# Patient Record
Sex: FEMALE | Race: WHITE | Employment: FULL TIME | URBAN - METROPOLITAN AREA
[De-identification: names, ages, dates, MRNs, and addresses within clinical notes are randomized per-mention and may not be internally consistent; named-entity substitution may affect disease eponyms.]

---

## 2017-05-03 PROBLEM — M25.50 ARTHRALGIA: Status: ACTIVE | Noted: 2017-05-03

## 2017-07-05 ENCOUNTER — HOSPITAL ENCOUNTER (OUTPATIENT)
Dept: MAMMOGRAPHY | Age: 47
Discharge: HOME | End: 2017-07-05
Attending: NURSE PRACTITIONER
Payer: COMMERCIAL

## 2017-07-05 DIAGNOSIS — Z12.39 SCREENING FOR MALIGNANT NEOPLASM OF BREAST: ICD-10-CM

## 2017-07-05 PROCEDURE — 77067 SCR MAMMO BI INCL CAD: CPT

## 2017-07-06 NOTE — PROGRESS NOTES
NO SPECIFIC MAMMOGRAPHIC EVIDENCE FOR MALIGNANCY. FOLLOW UP BILATERAL SCREENING MAMMOGRAPHY IS RECOMMENDED IN ONE YEAR.

## 2018-05-09 PROBLEM — Z99.89 OSA ON CPAP: Status: ACTIVE | Noted: 2018-05-09

## 2018-05-09 PROBLEM — G47.33 OSA ON CPAP: Status: ACTIVE | Noted: 2018-05-09

## 2018-06-11 ENCOUNTER — HOSPITAL ENCOUNTER (OUTPATIENT)
Dept: SURGERY | Age: 48
Discharge: HOME | End: 2018-06-11

## 2018-06-13 VITALS — BODY MASS INDEX: 26.66 KG/M2 | HEIGHT: 65 IN | WEIGHT: 160 LBS

## 2018-06-17 ENCOUNTER — ANESTHESIA EVENT (OUTPATIENT)
Dept: ENDOSCOPY | Age: 48
End: 2018-06-17
Payer: COMMERCIAL

## 2018-06-17 RX ORDER — FENTANYL CITRATE 50 UG/ML
100 INJECTION, SOLUTION INTRAMUSCULAR; INTRAVENOUS AS NEEDED
Status: CANCELLED | OUTPATIENT
Start: 2018-06-17

## 2018-06-17 RX ORDER — OXYCODONE HYDROCHLORIDE 5 MG/1
10 TABLET ORAL
Status: CANCELLED | OUTPATIENT
Start: 2018-06-17 | End: 2018-06-18

## 2018-06-17 RX ORDER — DIPHENHYDRAMINE HYDROCHLORIDE 50 MG/ML
12.5 INJECTION, SOLUTION INTRAMUSCULAR; INTRAVENOUS ONCE
Status: CANCELLED | OUTPATIENT
Start: 2018-06-17 | End: 2018-06-17

## 2018-06-17 RX ORDER — SODIUM CHLORIDE 0.9 % (FLUSH) 0.9 %
5-10 SYRINGE (ML) INJECTION EVERY 8 HOURS
Status: CANCELLED | OUTPATIENT
Start: 2018-06-17

## 2018-06-17 RX ORDER — HYDROMORPHONE HYDROCHLORIDE 2 MG/ML
0.5 INJECTION, SOLUTION INTRAMUSCULAR; INTRAVENOUS; SUBCUTANEOUS
Status: CANCELLED | OUTPATIENT
Start: 2018-06-17

## 2018-06-17 RX ORDER — NALOXONE HYDROCHLORIDE 0.4 MG/ML
0.1 INJECTION, SOLUTION INTRAMUSCULAR; INTRAVENOUS; SUBCUTANEOUS AS NEEDED
Status: CANCELLED | OUTPATIENT
Start: 2018-06-17 | End: 2018-06-17

## 2018-06-17 RX ORDER — OXYCODONE HYDROCHLORIDE 5 MG/1
5 TABLET ORAL
Status: CANCELLED | OUTPATIENT
Start: 2018-06-17 | End: 2018-06-18

## 2018-06-17 RX ORDER — SODIUM CHLORIDE, SODIUM LACTATE, POTASSIUM CHLORIDE, CALCIUM CHLORIDE 600; 310; 30; 20 MG/100ML; MG/100ML; MG/100ML; MG/100ML
75 INJECTION, SOLUTION INTRAVENOUS CONTINUOUS
Status: CANCELLED | OUTPATIENT
Start: 2018-06-17

## 2018-06-17 RX ORDER — SODIUM CHLORIDE 0.9 % (FLUSH) 0.9 %
5-10 SYRINGE (ML) INJECTION AS NEEDED
Status: CANCELLED | OUTPATIENT
Start: 2018-06-17

## 2018-06-17 RX ORDER — MIDAZOLAM HYDROCHLORIDE 1 MG/ML
2 INJECTION, SOLUTION INTRAMUSCULAR; INTRAVENOUS
Status: CANCELLED | OUTPATIENT
Start: 2018-06-17 | End: 2018-06-18

## 2018-06-17 RX ORDER — ACETAMINOPHEN 500 MG
500 TABLET ORAL ONCE
Status: CANCELLED | OUTPATIENT
Start: 2018-06-17 | End: 2018-06-17

## 2018-06-17 RX ORDER — ONDANSETRON 2 MG/ML
4 INJECTION INTRAMUSCULAR; INTRAVENOUS ONCE
Status: CANCELLED | OUTPATIENT
Start: 2018-06-17 | End: 2018-06-17

## 2018-06-18 ENCOUNTER — ANESTHESIA (OUTPATIENT)
Dept: ENDOSCOPY | Age: 48
End: 2018-06-18
Payer: COMMERCIAL

## 2018-06-18 ENCOUNTER — HOSPITAL ENCOUNTER (OUTPATIENT)
Age: 48
Setting detail: OUTPATIENT SURGERY
Discharge: HOME | End: 2018-06-18
Attending: SURGERY | Admitting: SURGERY
Payer: COMMERCIAL

## 2018-06-18 VITALS
HEIGHT: 65 IN | DIASTOLIC BLOOD PRESSURE: 64 MMHG | OXYGEN SATURATION: 93 % | HEART RATE: 67 BPM | SYSTOLIC BLOOD PRESSURE: 108 MMHG | RESPIRATION RATE: 16 BRPM | WEIGHT: 161.1 LBS | BODY MASS INDEX: 26.84 KG/M2 | TEMPERATURE: 97.1 F

## 2018-06-18 PROBLEM — Z12.11 SCREENING FOR COLON CANCER: Status: ACTIVE | Noted: 2018-06-18

## 2018-06-18 PROCEDURE — 77030013991 HC SNR POLYP ENDOSC BSC -A: Performed by: SURGERY

## 2018-06-18 PROCEDURE — 88305 TISSUE EXAM BY PATHOLOGIST: CPT | Performed by: SURGERY

## 2018-06-18 PROCEDURE — 74011250636 HC RX REV CODE- 250/636: Performed by: ANESTHESIOLOGY

## 2018-06-18 PROCEDURE — 76060000032 HC ANESTHESIA 0.5 TO 1 HR: Performed by: SURGERY

## 2018-06-18 PROCEDURE — 77030011640 HC PAD GRND REM COVD -A: Performed by: SURGERY

## 2018-06-18 PROCEDURE — 74011000250 HC RX REV CODE- 250: Performed by: ANESTHESIOLOGY

## 2018-06-18 PROCEDURE — 74011250636 HC RX REV CODE- 250/636

## 2018-06-18 PROCEDURE — 76040000026: Performed by: SURGERY

## 2018-06-18 PROCEDURE — 74011000250 HC RX REV CODE- 250

## 2018-06-18 RX ORDER — PROPOFOL 10 MG/ML
INJECTION, EMULSION INTRAVENOUS
Status: DISCONTINUED | OUTPATIENT
Start: 2018-06-18 | End: 2018-06-18 | Stop reason: HOSPADM

## 2018-06-18 RX ORDER — LIDOCAINE HYDROCHLORIDE 10 MG/ML
0.1 INJECTION INFILTRATION; PERINEURAL AS NEEDED
Status: DISCONTINUED | OUTPATIENT
Start: 2018-06-18 | End: 2018-06-18 | Stop reason: HOSPADM

## 2018-06-18 RX ORDER — SODIUM CHLORIDE, SODIUM LACTATE, POTASSIUM CHLORIDE, CALCIUM CHLORIDE 600; 310; 30; 20 MG/100ML; MG/100ML; MG/100ML; MG/100ML
1000 INJECTION, SOLUTION INTRAVENOUS CONTINUOUS
Status: DISCONTINUED | OUTPATIENT
Start: 2018-06-18 | End: 2018-06-18 | Stop reason: HOSPADM

## 2018-06-18 RX ORDER — LIDOCAINE HYDROCHLORIDE 20 MG/ML
INJECTION, SOLUTION EPIDURAL; INFILTRATION; INTRACAUDAL; PERINEURAL AS NEEDED
Status: DISCONTINUED | OUTPATIENT
Start: 2018-06-18 | End: 2018-06-18 | Stop reason: HOSPADM

## 2018-06-18 RX ADMIN — LIDOCAINE HYDROCHLORIDE 20 MG: 20 INJECTION, SOLUTION EPIDURAL; INFILTRATION; INTRACAUDAL; PERINEURAL at 09:04

## 2018-06-18 RX ADMIN — SODIUM CHLORIDE, SODIUM LACTATE, POTASSIUM CHLORIDE, AND CALCIUM CHLORIDE 1000 ML: 600; 310; 30; 20 INJECTION, SOLUTION INTRAVENOUS at 08:03

## 2018-06-18 RX ADMIN — LIDOCAINE HYDROCHLORIDE 0.1 ML: 10 INJECTION, SOLUTION INFILTRATION; PERINEURAL at 08:03

## 2018-06-18 RX ADMIN — PROPOFOL 140 MCG/KG/MIN: 10 INJECTION, EMULSION INTRAVENOUS at 09:03

## 2018-06-18 NOTE — PROCEDURES
Procedure in Detail:  Informed consent was obtained for the procedure. The patient was placed in the left lateral decubitus position and sedation was induced by anesthesia. The [id number] was inserted into the rectum and advanced under direct vision to the cecum, which was identified by the ileocecal valve and appendiceal orifice. The quality of the colonic preparation was excellent. A careful inspection was made as the colonoscope was withdrawn, including a retroflexed view of the rectum; findings and interventions are described below. Appropriate photodocumentation was obtained. Findings:   Rectum:     - Protruding lesions:     -Pedunculated Polyp(s) size 5 mm removed by polypectomy (snare cautery)  Sigmoid:     - Protruding lesions:     -Sessile Polyp(s) size 5 mm removed by polypectomy (snare cautery) 2 small 5 mm polyps removed with snare cautery. Descending Colon:   Normal  Transverse Colon:   Normal  Ascending Colon:   Normal  Cecum:   Normal  Terminal Ileum:   Not entered      Specimens: Specimens were collected and sent to pathology. Complications: None; patient tolerated the procedure well. \    EBL - none    Recommendations:   - Await pathology.      Signed By: Mell Fuentes DO                        June 18, 2018

## 2018-06-18 NOTE — ANESTHESIA PREPROCEDURE EVALUATION
Anesthetic History   No history of anesthetic complications            Review of Systems / Medical History  Patient summary reviewed and pertinent labs reviewed    Pulmonary        Sleep apnea: CPAP  Smoker         Neuro/Psych   Within defined limits           Cardiovascular  Within defined limits                Exercise tolerance: >4 METS     GI/Hepatic/Renal  Within defined limits              Endo/Other  Within defined limits           Other Findings              Physical Exam    Airway  Mallampati: II  TM Distance: 4 - 6 cm  Neck ROM: normal range of motion   Mouth opening: Normal     Cardiovascular    Rhythm: regular  Rate: normal         Dental  No notable dental hx       Pulmonary  Breath sounds clear to auscultation               Abdominal  GI exam deferred       Other Findings            Anesthetic Plan    ASA: 2  Anesthesia type: total IV anesthesia          Induction: Intravenous  Anesthetic plan and risks discussed with: Patient

## 2018-06-18 NOTE — ANESTHESIA POSTPROCEDURE EVALUATION
Post-Anesthesia Evaluation and Assessment    Patient: Pebbles Llamas MRN: [id number]  SSN: [id number]    YOB: 1970  Age: 50 y.o. Sex: female       Cardiovascular Function/Vital Signs  Visit Vitals    /66    Pulse 63    Temp 36.2 °C (97.1 °F)    Resp 16    Ht 5' 5\" (1.651 m)    Wt 73.1 kg (161 lb 1.6 oz)    SpO2 93%    BMI 26.81 kg/m2       Patient is status post total IV anesthesia anesthesia for Procedure(s):  COLONOSCOPY  BMI 27  ENDOSCOPIC POLYPECTOMY. Nausea/Vomiting: None    Postoperative hydration reviewed and adequate. Pain:  Pain Scale 1: Visual (06/18/18 0949)  Pain Intensity 1: 0 (06/18/18 0949)   Managed    Neurological Status: At baseline    Mental Status and Level of Consciousness: Arousable    Pulmonary Status:   O2 Device: Room air (06/18/18 0949)   Adequate oxygenation and airway patent    Complications related to anesthesia: None    Post-anesthesia assessment completed.  No concerns    Signed By: Joy Valenzuela MD     June 18, 2018

## 2018-06-18 NOTE — H&P
Mary Lou Gregory    6/18/2018    Date of Admission:  6/18/2018      Subjective:     Patient is a 50 y.o.  female presents for screening colonoscopy. The patient reports no problems. The patient denies family history of colon cancer. The patient has never had a colonoscopy in the past. Otherwise there is no reported rectal bleeding or melena. No changes in appetite or unusual wt loss. No abdominal pain or bloating. No reported changes in bowel habits. Patient Active Problem List    Diagnosis Date Noted    Screening for colon cancer 06/18/2018    KRISTYN on CPAP 05/09/2018    Arthralgia 05/03/2017    Suspected sleep apnea 08/19/2016    Snoring 08/19/2016    Inadequate sleep hygiene 08/19/2016    Pure hypercholesterolemia 04/11/2016    Vitamin D deficiency 04/11/2016     Past Medical History:   Diagnosis Date    Current smoker     1/2 pack daily:using chantix to quit    Menopause     Sleep apnea     c-pap      Past Surgical History:   Procedure Laterality Date    HX APPENDECTOMY      HX CHOLECYSTECTOMY      HX GYN      tubal ligation      Prior to Admission Medications   Prescriptions Last Dose Informant Patient Reported? Taking?    CHANTIX STARTING MONTH BOX 0.5 mg (11)- 1 mg (42) DsPk 6/17/2018 at Unknown time  Yes Yes   Sig: TAKE AS DIRECTED ON PACKAGE: Twice daily   cyclobenzaprine (FLEXERIL) 10 mg tablet 6/16/2018  No No   Sig: TAKE ONE TABLET BY MOUTH IN THE EVENING AS NEEDED FOR MUSCLE SPASM      Facility-Administered Medications: None     Allergies   Allergen Reactions    Other Medication Rash     Guaifenesin codeine cough syrup---Red face    Penicillins Other (comments)     Causes yeast infections    Sea Food [Seafood] Hives      Social History   Substance Use Topics    Smoking status: Current Every Day Smoker     Packs/day: 0.50     Years: 30.00     Types: Cigarettes    Smokeless tobacco: Never Used      Comment: trying to quit:taking chantix    Alcohol use No Social History     Social History Narrative     Family History   Problem Relation Age of Onset    Cancer Mother      breast cancer    Heart Disease Mother      rheumatic fever, heart valves    Breast Cancer Mother 61    Cancer Father      liver    No Known Problems Sister     No Known Problems Brother         Current Facility-Administered Medications   Medication Dose Route Frequency    lidocaine (XYLOCAINE) 10 mg/mL (1 %) injection 0.1 mL  0.1 mL SubCUTAneous PRN    lactated Ringers infusion 1,000 mL  1,000 mL IntraVENous CONTINUOUS       Review of Systems  A comprehensive review of systems was negative except for that written in the HPI. Objective:     Vitals:    06/18/18 0757   BP: 99/72   Pulse: 73   Resp: 16   Temp: 98.3 °F (36.8 °C)   SpO2: 96%   Weight: 161 lb 1.6 oz (73.1 kg)   Height: 5' 5\" (1.651 m)     PHYSICAL EXAM   Physical Examination: General appearance - alert, well appearing, and in no distress  Mental status - alert, oriented to person, place, and time  Eyes - pupils equal and reactive, extraocular eye movements intact  Nose - normal and patent, no erythema, discharge or polyps  Neck - supple, no significant adenopathy  Chest - clear to auscultation, no wheezes, rales or rhonchi, symmetric air entry  Heart - normal rate, regular rhythm, normal S1, S2, no murmurs, rubs, clicks or gallops  Abdomen - soft, nontender, nondistended, no masses or organomegaly  Neurological - alert, oriented, normal speech, no focal findings or movement disorder noted  Musculoskeletal - no joint tenderness, deformity or swelling  Extremities - peripheral pulses normal, no pedal edema, no clubbing or cyanosis  Skin - normal coloration and turgor, no rashes, no suspicious skin lesions noted      No results for input(s): WBC, HGB, HCT, PLT, HGBEXT, HCTEXT, PLTEXT in the last 72 hours. No results for input(s): NA, K, CL, GLU, CO2, BUN, CREA, MG, PHOS, TROIQ, INR, BNPP in the last 72 hours.     No lab exists for component: TROIP, INREXT  No results for input(s): PH, PCO2, PO2, HCO3 in the last 72 hours.     Assessment:     Hospital Problems  Date Reviewed: 5/9/2018          Codes Class Noted POA    * (Principal)Screening for colon cancer ICD-10-CM: Z12.11  ICD-9-CM: V76.51  6/18/2018 Unknown            Plan:   Screening colonoscopy      Cheri Esparza,

## 2018-06-18 NOTE — DISCHARGE INSTRUCTIONS
Gastrointestinal Colonoscopy/Flexible Sigmoidoscopy - Lower Exam Discharge Instructions  1. Call Dr. Crystal Malik at office for any problems or questions. 2. Contact the doctors office for follow up appointment as directed  3. Medication may cause drowsiness for several hours, therefore, do not drive or operate machinery for remainder of the day. 4. No alcohol today. 5. Ordinarily, you may resume regular diet and activity after exam unless otherwise specified by your physician. 6. Because of air put into your colon during exam, you may experience some abdominal distension, relieved by the passage of gas, for several hours. 7. Contact your physician if you have any of the following:  a. Excessive amount of bleeding - large amount when having a bowel movement. Occasional specks of blood with bowel movement would not be unusual.  b. Severe abdominal pain  c. Fever or Chills  8. Polyp Removal - follow these additional instructions  a. Clear liquid diet for the next meal (jell-o, broth, clear drinks)  b. Soft diet for 24 hours, then resume regular diet   c. Take Metamucil - 1 tablespoon in juice every morning for 3 days  d. No Aspirin, Advil, Aleve, Nuprin, Ibuprofen, or medications that contain these drugs for 2 weeks. Instructions given to Ash Monroy and other family members.

## 2018-06-18 NOTE — IP AVS SNAPSHOT
[address] [facility] 
 
 
 [address] 
[phone] Patient: Vince Fried MRN: [id number] :1970 About your hospitalization You were admitted on:  2018 You last received care in the:  [facility] 1 PREOP You were discharged on:  2018 Why you were hospitalized Your primary diagnosis was:  Not on File Follow-up Information None Your Scheduled Appointments 2018 COLONOSCOPY with Diana Esparza DO  
[facility] ENDOSCOPY ([address] [place] [address]) [facility] [address] [place] [address] Rd  
[phone] 2018  8:00 AM EDT  
BRAD MAMMO SCREENING with [facility] BRAD BI ROOM 1 [address] [facility] ([address] [place] [address]) [address] [facility] [place] [address]  
[phone] ***** NOTE: Appointments for the Mobile Mammography UNIT CANNOT be made on My Chart *****  PATIENT ARRIVAL - Please report 30 minutes early to check in. GENERAL INSTRUCTIONS -  On the day of your exam do not use any bath powder, deodorant or lotions on the chest or armpit area. Wear two-piece outfit for ease of changing. Allow at least 1 hour for test. -  If scheduled at [facility], please register on the 1st floor before going upstairs. [address] [facility]. 2nd floor [address] [facility] Discharge Orders None A check kate indicates which time of day the medication should be taken. My Medications ASK your doctor about these medications Instructions Each Dose to Equal  
 Morning Noon Evening Bedtime CHANTIX STARTING MONTH BOX 0.5 mg (11)- 1 mg (42) Dspk Generic drug:  varenicline Your last dose was: Your next dose is: TAKE AS DIRECTED ON PACKAGE: Twice daily cyclobenzaprine 10 mg tablet Commonly known as:  FLEXERIL Your last dose was: Your next dose is: TAKE ONE TABLET BY MOUTH IN THE EVENING AS NEEDED FOR MUSCLE SPASM Discharge Instructions Gastrointestinal Colonoscopy/Flexible Sigmoidoscopy - Lower Exam Discharge Instructions 1. Call Dr. Chencho Gan at office for any problems or questions. 2. Contact the doctors office for follow up appointment as directed 3. Medication may cause drowsiness for several hours, therefore, do not drive or operate machinery for remainder of the day. 4. No alcohol today. 5. Ordinarily, you may resume regular diet and activity after exam unless otherwise specified by your physician. 6. Because of air put into your colon during exam, you may experience some abdominal distension, relieved by the passage of gas, for several hours. 7. Contact your physician if you have any of the following: 
a. Excessive amount of bleeding  large amount when having a bowel movement. Occasional specks of blood with bowel movement would not be unusual. 
b. Severe abdominal pain 
c. Fever or Chills 8. Polyp Removal  follow these additional instructions 
a. Clear liquid diet for the next meal (jell-o, broth, clear drinks) b. Soft diet for 24 hours, then resume regular diet  
c. Take Metamucil  1 tablespoon in juice every morning for 3 days 
d. No Aspirin, Advil, Aleve, Nuprin, Ibuprofen, or medications that contain these drugs for 2 weeks. Instructions given to Melissa Spears and other family members. Introducing [facility]! Dear Taiwo Chandler: Thank you for requesting a [employer] account. Our records indicate that you already have an active [employer] account. You can access your account anytime at [email or web address] Did you know that you can access your hospital and ER discharge instructions at any time in [place]? You can also review all of your test results from your hospital stay or ER visit. Additional Information If you have questions, please visit the Frequently Asked Questions section of the [employer] website at [email or web address] Remember, [employer] is NOT to be used for urgent needs. For medical emergencies, dial 911. Now available from your iPhone and Android! Introducing Santi Yan As a [employer] patient, I wanted to make you aware of our electronic visit tool called Santi Yan. [employer] 24/7 allows you to connect within minutes with a medical provider 24 hours a day, seven days a week via a mobile device or tablet or logging into a secure website from your computer. You can access Santi Yan from anywhere in the [place]. A virtual visit might be right for you when you have a simple condition and feel like you just dont want to get out of bed, or cant get away from work for an appointment, when your regular [employer] provider is not available (evenings, weekends or holidays), or when youre out of town and need minor care. Electronic visits cost only $49 and if the [employer] 24/7 provider determines a prescription is needed to treat your condition, one can be electronically transmitted to a nearby pharmacy*. Please take a moment to enroll today if you have not already done so. The enrollment process is free and takes just a few minutes. To enroll, please download the [employer] 24/7 blayne to your tablet or phone, or visit [email or web address] to enroll on your computer. And, as an [address] [place] [address] patient with a [employer] account, the results of your visits will be scanned into your electronic medical record and your primary care provider will be able to view the scanned results.    
We urge you to continue to see your regular [employer] provider for your ongoing medical care. And while your primary care provider may not be the one available when you seek a Santi Martedavisfin virtual visit, the peace of mind you get from getting a real diagnosis real time can be priceless. For more information on Santi Martedavisfin, view our Frequently Asked Questions (FAQs) at [email or web address]. Sincerely, 
 
Giovanni Zamorano MD 
Chief Medical Officer Dipak Guerra *:  certain medications cannot be prescribed via Santi Marteyodit Providers Seen During Your Hospitalization Provider Specialty Primary office phone Jessicamanasa Kinjal, [address] [facility] Surgery [phone] Your Primary Care Physician (PCP) Primary Care Physician Office Phone Office Fax Alisia Doron [phone] You are allergic to the following Allergen Reactions Other Medication Rash Guaifenesin codeine cough syrup---Red face Penicillins Other (comments) Causes yeast infections [address] (Seafood) Hives Recent Documentation Height Weight BMI OB Status Smoking Status 1.651 m 73.1 kg 26.81 kg/m2 Menopause Current Every Day Smoker Emergency Contacts Name Discharge Info Relation Home Work Mobile Cece Britton  Spouse [3] [phone] Kelley Wing  Child [2]   [phone] Patient Belongings The following personal items are in your possession at time of discharge: 
  Dental Appliances: Uppers Please provide this summary of care documentation to your next provider. Signatures-by signing, you are acknowledging that this After Visit Summary has been reviewed with you and you have received a copy. Patient Signature:  ____________________________________________________________ Date:  ____________________________________________________________  
  
Malia Prado  Provider Signature: ____________________________________________________________ Date:  ____________________________________________________________

## 2018-07-11 ENCOUNTER — HOSPITAL ENCOUNTER (OUTPATIENT)
Dept: MAMMOGRAPHY | Age: 48
Discharge: HOME | End: 2018-07-11
Attending: NURSE PRACTITIONER
Payer: COMMERCIAL

## 2018-07-11 DIAGNOSIS — Z12.39 SCREENING FOR MALIGNANT NEOPLASM OF BREAST: ICD-10-CM

## 2018-07-11 PROCEDURE — 77067 SCR MAMMO BI INCL CAD: CPT

## (undated) DEVICE — CANNULA NSL ORAL AD FOR CAPNOFLEX CO2 O2 AIRLFE

## (undated) DEVICE — REM POLYHESIVE ADULT PATIENT RETURN ELECTRODE: Brand: VALLEYLAB

## (undated) DEVICE — SNARE POLYP SM W13MMXL240CM SHTH DIA2.4MM OVL FLX DISP

## (undated) DEVICE — CONTAINER PREFIL FRMLN 40ML --

## (undated) DEVICE — AIRLIFE™ OXYGEN TUBING 7 FEET (2.1 M) CRUSH RESISTANT OXYGEN TUBING, VINYL TIPPED: Brand: AIRLIFE™

## (undated) DEVICE — CONNECTOR TBNG OD5-7MM O2 END DISP

## (undated) DEVICE — KENDALL RADIOLUCENT FOAM MONITORING ELECTRODE RECTANGULAR SHAPE: Brand: KENDALL